# Patient Record
Sex: FEMALE | Race: WHITE | URBAN - METROPOLITAN AREA
[De-identification: names, ages, dates, MRNs, and addresses within clinical notes are randomized per-mention and may not be internally consistent; named-entity substitution may affect disease eponyms.]

---

## 2017-01-04 ENCOUNTER — DOCUMENTATION ONLY (OUTPATIENT)
Dept: SLEEP MEDICINE | Facility: CLINIC | Age: 42
End: 2017-01-04

## 2017-01-04 NOTE — PROGRESS NOTES
30 DAY STM VISIT    Message left for patient to return call     Assessment: Pt not meeting objective benchmarks for compliance.  Action plan: Waiting for patient to return call. Pt to have 6 month STM visit and 2 week STM recheck appt scheduled.  Patient has a follow up visit with Dr. Hummel on 1/20/2017.   Device settings:    5-15 cm H20    Objective measures: 14 day rolling measures     Compliance   (Goal >70%)  --% compliance greater than four hours rolling average 14 days: 0 %      Leak   (Goal < 24 lpm) --No Data Recorded lpm  last data upload      AHI  (Goal < 5)  --No Data Recorded last data upload        Usage  (Goal >240)  --Time mask on face 14 day average: 10 min

## 2017-05-05 ENCOUNTER — OFFICE VISIT (OUTPATIENT)
Dept: PODIATRY | Facility: CLINIC | Age: 42
End: 2017-05-05
Payer: COMMERCIAL

## 2017-05-05 VITALS
BODY MASS INDEX: 44.22 KG/M2 | SYSTOLIC BLOOD PRESSURE: 140 MMHG | DIASTOLIC BLOOD PRESSURE: 88 MMHG | WEIGHT: 259 LBS | HEIGHT: 64 IN

## 2017-05-05 DIAGNOSIS — M79.674 TOE PAIN, RIGHT: Primary | ICD-10-CM

## 2017-05-05 DIAGNOSIS — L60.0 INGROWING NAIL: ICD-10-CM

## 2017-05-05 PROCEDURE — 99203 OFFICE O/P NEW LOW 30 MIN: CPT | Mod: 25 | Performed by: PODIATRIST

## 2017-05-05 PROCEDURE — 11730 AVULSION NAIL PLATE SIMPLE 1: CPT | Mod: T5 | Performed by: PODIATRIST

## 2017-05-05 NOTE — PROGRESS NOTES
"  ASSESSMENT/PLAN:    Encounter Diagnoses   Name Primary?     Toe pain, right Yes     Ingrowing nail, lateral edge, right hallux      The potential causes and nature of an ingrown toenail were discussed with the patient.  We reviewed the natural history/prognosis of the condition and potential risks if no treatment is provided.      Treatment options discussed included conservative management (oral antibiotics, soaking of foot, adequate width shoes)  as well as surgical management (partial or total nail removal).  The pros and cons of both forms of treatment were reviewed.      After thorough discussion and answering all questions, the patient elected to a partial avulsion, lateral edge, right hallux.    Nail Avulsion Procedure  (non permanent removal)    The procedure was discussed with the patient, including risk of infection, abnormal nail regrowth, and possible need for a future nail procedure.  Post procedure home cares were explained. These cares are important for preventing infection and aiding in timely healing.   Verbal and written consent was obtained.   The site was marked and the \"Time Out\" called.     The base of the right hallux  was injected with 2 cc of  2% Lidocaine plain.  The toe was then prepped with betadine solution.  A tourniquet was applied around the base of the toe for hemostasis.   Next the toe was checked for adequate anesthesia.  With the Pt comfortable, the lateral nail was freed from the nail bed and marginal soft tissue attachments with a blunt instrument.  ( A nail splitter was then used to make a longitudinal cut 2mm from the lateral nail fold.  It was completed, atraumatically, under the eponychium with a [place] blade. ) Next, it was firmly grasped with a hemostat and removed in total.      Silvadene ointment was applied to the nail bed, followed by a compressive dressing.  The tourniquet was removed.  The distal toe turned immediately pink.  The foot was kept elevated for several " "minutes.  The patient tolerated the procedure well.      Patient is instructed to watch for, and call if,  increasing redness, drainage, and pain after 2-3 days.  Post procedure instructions provided - handout given.    Marcus Montaño DPM       Body mass index is 44.46 kg/(m^2).    Weight management plan: Patient was referred to their PCP to discuss a diet and exercise plan.      Marcus Montaño DPM, FACFAS, MS    [place] [facility] Podiatry/Foot & Ankle Surgery      ____________________________________________________________________    HPI:       Chief Complaint: right big toe pain, concern for an ingrown toenail; she reports occasional drainage  Onset of problem: 2 months  Pain/ discomfort is described as:  Toe gets red, sensitive to touch  Ratin/10   Frequency:  \"4 days or so every couple weeks\"    The pain is made worse with tennis shoes  Previous treatment: Epsom salt bath    *No past medical history on file.*  *No past surgical history on file.*  *  Current Outpatient Prescriptions   Medication Sig Dispense Refill     order for DME AIRSENSE 10  5-15CM/H20  PILLOWS NUANCE PRO       multivitamin, therapeutic with minerals (MULTI-VITAMIN) TABS Take 1 tablet by mouth daily       naproxen (NAPROSYN) 500 MG tablet Take 1 tablet (500 mg) by mouth 2 times daily as needed for moderate pain 60 tablet 0       ROS:     A 10-point review of systems was performed and is positive for that noted in the HPI and as seen below.  All other areas are negative.     Numbness in feet?  no   Calf pain with walking? no  Recent foot/ankle injury? no  Weight change over past 12 months? 30# gain  Self perception as overweight? yes  Recent flu-like symptoms? no  Joint pain other than feet ? no    Social History: Employment:  teacher;  Exercise/Physical activity:  Walking 3-4 days/ week;  Tobacco use:  no  Social History     Social History     Marital status: Single     Spouse name: N/A     Number of children: N/A     Years of " "education: N/A     Occupational History     Not on file.     Social History Main Topics     Smoking status: Never Smoker     Smokeless tobacco: Not on file     Alcohol use Yes     Drug use: No     Sexual activity: Yes     Partners: Male     Other Topics Concern     Not on file     Social History Narrative       Family history:  Family History   Problem Relation Age of Onset     Unknown/Adopted Mother      Unknown/Adopted Father        Rheumatoid arthritis:  no  Foot Problems: no  Diabetes: grandparent      EXAM:    Vitals: /88 (BP Location: Right arm, Patient Position: Chair, Cuff Size: Adult Regular)  Ht 5' 4\" (1.626 m)  Wt 259 lb (117.5 kg)  BMI 44.46 kg/m2  BMI: Body mass index is 44.46 kg/(m^2).  Height: 5' 4\"    Constitutional/ general:  Pt is in no apparent distress, appears well-nourished.  Cooperative with history and physical exam.     Vascular:  Pedal pulses are palpable bilaterally for both the DP and PT arteries.  CFT < 3 sec.  No edema.  Pedal hair growth noted.     Neuro:  Alert and oriented x 3. Coordinated gait.  Light touch sensation is intact to the L4, L5, S1 distributions. No obvious deficits.  No evidence of neurological-based weakness, spasticity, or contracture in the lower extremities.     Derm: focal erythema, edema, crust, pain along the lateral skin fold, right hallux nail unit    Musculoskeletal:    Lower extremity muscle strength is normal.  Patient is ambulatory without an assistive device or brace .  No gross deformities.  Increased hallux interphalangeal angle on the right.      Marcus Montaño DPM, FACYEIMI, MS    [place] [facility] of Podiatry/Foot & Ankle Surgery              "

## 2017-05-05 NOTE — MR AVS SNAPSHOT
After Visit Summary   5/5/2017    Judith Lopez    MRN: [id number]           Patient Information     Date Of Birth          1975        Visit Information        Provider Department      5/5/2017 8:00 AM Marcus Montaño DPM [facility]        Today's Diagnoses     Toe pain, right    -  1    Ingrowing nail, lateral edge, right hallux          Care Instructions    Non Permanent Nail Removal Instructions       1. Keep bandage on until that evening or the day after your procedure. If the bandage falls off, start the soaking process.  2. Some bleeding is normal. If bleeding seems excessive to you, place ice on top of your foot for 15-20 minutes and elevate your foot above heart level.  3. Over the counter pain medication, elevating your foot and ice application is all you will need for pain control.  4. If the bandage feels too tight and your toe is throbbing it is ok to remove the bandage and start soaking.   5. Soaking process: Soak your foot twice a day in mild skin friendly soap (dish or hand soap) and warm water for 15 minutes. Do this for 2 weeks  It is ok to soak your foot for a few minutes to loosen the dressing applied in the clinic.   After soaking, blot dry and apply a regular band aid.  6. It is normal to experience some discomfort and redness around the nail for several days following the procedure. Drainage will likely appear a red- yellow. This is normal. If your toe is still draining a red-yellow fluid after 2 weeks continue to soak foot.  7. Initial discomfort might last for 2-3 days. You may resume with regular activity as soon as you are comfortable, as long as you keep the wound clean and dry and follow the soaking instruction. It is recommended that you do not enter public swimming pools/hot tubs while your toe is draining.  8. If you are experiencing worsening pain and redness or notice pus after 2-3 days please contact the clinic. If it is after  "hours call the clinic number and follow the voice prompter. This will direct you to an on call doctor.    Marcus Montaño DPM  Podiatry/Foot and Ankle Surgery    [facility] [place]       [facility]  [address]                  [address]        [address] [place], [address]       [place], [address]       [place] [address]   Monday and Friday morning        Thursday Wednesday  ________________________________________________________________________    **Podiatry general nurse line: [phone]**    Triage Nurse - for Post Operative patient calls  [phone]    Surgery Scheduler - [phone]          Follow-ups after your visit        Who to contact     If you have questions or need follow up information about today's clinic visit or your schedule please contact [facility] directly at [phone].  Normal or non-critical lab and imaging results will be communicated to you by MyChart, letter or phone within 4 business days after the clinic has received the results. If you do not hear from us within 7 days, please contact the clinic through [employer] or phone. If you have a critical or abnormal lab result, we will notify you by phone as soon as possible.  Submit refill requests through [employer] or call your pharmacy and they will forward the refill request to us. Please allow 3 business days for your refill to be completed.          Additional Information About Your Visit        [employer] Information     [employer] lets you send messages to your doctor, view your test results, renew your prescriptions, schedule appointments and more. To sign up, go to [email or web address] . Click on \"Log in\" on the left side of the screen, which will take you to the Welcome page. Then click on \"Sign up Now\" on the right side of the page.     You will be asked to enter the access code listed below, as well as some personal " "information. Please follow the directions to create your username and password.     Your access code is: [email or web address]  Expires: 8/3/2017  8:23 AM     Your access code will  in 90 days. If you need help or a new code, please call your [facility] clinic or [phone].        Care EveryWhere ID     This is your Care EveryWhere ID. This could be used by other organizations to access your [facility] medical records  [id number]        Your Vitals Were     Height BMI (Body Mass Index)                5' 4\" (1.626 m) 44.46 kg/m2           Blood Pressure from Last 3 Encounters:   17 140/88   16 156/90   11/06/15 130/82    Weight from Last 3 Encounters:   17 259 lb (117.5 kg)   16 259 lb 6.4 oz (117.7 kg)   11/06/15 240 lb 9.6 oz (109.1 kg)              We Performed the Following     REMOVAL OF NAIL PLATE SIMPLE SINGLE        Primary Care Provider    None Specified       No primary provider on file.        Thank you!     Thank you for choosing [facility]  for your care. Our goal is always to provide you with excellent care. Hearing back from our patients is one way we can continue to improve our services. Please take a few minutes to complete the written survey that you may receive in the mail after your visit with us. Thank you!             Your Updated Medication List - Protect others around you: Learn how to safely use, store and throw away your medicines at www.disposemymeds.org.          This list is accurate as of: 17  8:27 AM.  Always use your most recent med list.                   Brand Name Dispense Instructions for use    Multi-vitamin Tabs tablet      Take 1 tablet by mouth daily       naproxen 500 MG tablet    NAPROSYN    60 tablet    Take 1 tablet (500 mg) by mouth 2 times daily as needed for moderate pain       order for DME      AIRSENSE 10 5-15CM/H20 PILLOWS NUANCE PRO         "

## 2017-05-05 NOTE — NURSING NOTE
"Chief Complaint   Patient presents with     Ingrown Toenail     x2 months right great toe        Initial /88 (BP Location: Right arm, Patient Position: Chair, Cuff Size: Adult Regular)  Ht 5' 4\" (1.626 m)  Wt 259 lb (117.5 kg)  BMI 44.46 kg/m2 Estimated body mass index is 44.46 kg/(m^2) as calculated from the following:    Height as of this encounter: 5' 4\" (1.626 m).    Weight as of this encounter: 259 lb (117.5 kg).  Medication Reconciliation: complete   Shaila Johnson MA      "

## 2017-05-05 NOTE — PATIENT INSTRUCTIONS
Non Permanent Nail Removal Instructions       1. Keep bandage on until that evening or the day after your procedure. If the bandage falls off, start the soaking process.  2. Some bleeding is normal. If bleeding seems excessive to you, place ice on top of your foot for 15-20 minutes and elevate your foot above heart level.  3. Over the counter pain medication, elevating your foot and ice application is all you will need for pain control.  4. If the bandage feels too tight and your toe is throbbing it is ok to remove the bandage and start soaking.   5. Soaking process: Soak your foot twice a day in mild skin friendly soap (dish or hand soap) and warm water for 15 minutes. Do this for 2 weeks  It is ok to soak your foot for a few minutes to loosen the dressing applied in the clinic.   After soaking, blot dry and apply a regular band aid.  6. It is normal to experience some discomfort and redness around the nail for several days following the procedure. Drainage will likely appear a red- yellow. This is normal. If your toe is still draining a red-yellow fluid after 2 weeks continue to soak foot.  7. Initial discomfort might last for 2-3 days. You may resume with regular activity as soon as you are comfortable, as long as you keep the wound clean and dry and follow the soaking instruction. It is recommended that you do not enter public swimming pools/hot tubs while your toe is draining.  8. If you are experiencing worsening pain and redness or notice pus after 2-3 days please contact the clinic. If it is after hours call the clinic number and follow the voice prompter. This will direct you to an on call doctor.    Marcus Montaño DPM  Podiatry/Foot and Ankle Surgery    [facility]      [facility] [place]  [address]                  [address]        [address], [address]       [place], [id number] [place], [place] [address], [place] [address]   Monday and Friday  morning        Thursday               Wednesday  ________________________________________________________________________    **Podiatry general nurse line: [phone]**    Triage Nurse - for Post Operative patient calls  [phone]    Surgery Scheduler - [phone]